# Patient Record
(demographics unavailable — no encounter records)

---

## 2024-11-05 NOTE — PHYSICAL EXAM

## 2024-11-05 NOTE — DEVELOPMENTAL MILESTONES
[Normal Development] : Normal Development [None] : none [Goes to the bathroom and urinates] : goes to bathroom and urinates by self [Plays and shares with others] : plays and shares with others [Put on coat, jacket, or shirt by self] : puts on coat, jacket, or shirt by self [Begins to play make-believe] : begins to play make-believe [Eats independently] : eats independently [Uses 3-word sentences] : uses 3-word sentences [Uses words that are 75% intelligible] : uses words that are 75% intelligible to strangers [Understands simple prepositions] : understands simple prepositions [Tells a story from a book or TV] : tells a story from a book or TV [Compares things using words such] : compares things using words such as bigger or shorter [Pedals tricycle] : pedals tricycle [Climbs on and off couch] : climbs on and off couch or chair [Jumps forward] : jumps forward [Draws a single Hopi] : draws a single [place]i [Draws a person with head] : draws a person with head and one other body part

## 2024-11-05 NOTE — DISCUSSION/SUMMARY
[Normal Growth] : growth [Normal Development] : development [None] : No known medical problems [No Elimination Concerns] : elimination [No Feeding Concerns] : feeding [No Skin Concerns] : skin [Normal Sleep Pattern] : sleep [Family Support] : family support [Encouraging Literacy Activities] : encouraging literacy activities [Playing with Peers] : playing with peers [Promoting Physical Activity] : promoting physical activity [Safety] : safety [No Medications] : ~He/She~ is not on any medications [Parent/Guardian] : parent/guardian [] : The components of the vaccine(s) to be administered today are listed in the plan of care. The disease(s) for which the vaccine(s) are intended to prevent and the risks have been discussed with the caretaker.  The risks are also included in the appropriate vaccination information statements which have been provided to the patient's caregiver.  The caregiver has given consent to vaccinate. [FreeTextEntry1] : 3 year old female presenting for HCM. Growth and development are normal. PE unremarkable. Patient is alert, active and playful. Immunizations are delayed. Will receive DTaP, Pneumococcal, Polio, and Hep B. Regression in toileting habits likely behavioral as patient just started school and is in a new environment. Will continue to monitor.  - routine and anticipatory guidance given  - choking hazards reviewed  - dental and optometry referral provided  - OTC Zyrtec for seasonal allergies  - Benadryl for motion sickness  - monitor bed-wetting occurrences. Consider behavioral evaluation if persistent.  - f/u CBC and lead  - RTC at 4 year old HCM or PRN

## 2024-11-05 NOTE — HISTORY OF PRESENT ILLNESS
[Mother] : mother [whole ___ oz/d] : consumes [unfilled] oz of whole cow's milk per day [Fruit] : fruit [Vegetables] : vegetables [Meat] : meat [Grains] : grains [Eggs] : eggs [Fish] : fish [Dairy] : dairy [Normal] : Normal [In bed] : In bed [Bottle Use] : Bottle use [Brushing teeth] : Brushing teeth [Toothpaste] : Primary Fluoride Source: Toothpaste [In nursery school] : In nursery school [Playtime (60 min/d)] : Playtime 60 min a day [Appropiate parent-child communication] : Appropriate parent-child communication [Child given choices] : Child given choices [Child Cooperates] : Child cooperates [Parent has appropriate responses to behavior] : Parent has appropriate responses to behavior [No] : Not at  exposure [Water heater temperature set at <120 degrees F] : Water heater temperature set at <120 degrees F [Car seat in back seat] : Car seat in back seat [Smoke Detectors] : Smoke detectors [Supervised play near cars and streets] : Supervised play near cars and streets [Carbon Monoxide Detectors] : Carbon monoxide detectors [NO] : No [Delayed] : Delayed [Exposure to electronic nicotine delivery system] : No exposure to electronic nicotine delivery system [FreeTextEntry7] : 3 year old female here for [facility] [FreeTextEntry1] : 3 year old female presenting for [facility]. Mom is concerned that Reagan has been wetting the bed again since the start of school. Patient has been toilet trained. No episodes occur at school during nap time. Mom has stopped giving fluids 2 hours before bedtime. No other concerns. Patient is eating all food groups. Stooling and voiding at baseline. Meeting her developmental milestones appropriately.   SDOH Screening Questionnaire   SDOH (Social Determinants of Health) Questionnaire: 1. Housing: Do you worry that in the upcoming months, your family, or child, may not have a safe or stable place to live? no 2. Food security: Within the last 12 months, did the food you bought not last and you did not have money to buy more? no 3. Community: Do you need help getting public benefits like food stamps or WIC? no 4. Transportation: Does your child have chronic medical condition and therefore struggle with transportation to attend medical appointments? no 5. Healthcare Access: Do you need help getting health or dental insurance? no       Result: Negative Screen. No further intervention needed.